# Patient Record
(demographics unavailable — no encounter records)

---

## 2024-11-05 NOTE — PHYSICAL EXAM
[de-identified] : [facility] 4 [de-identified] : 2+ [Normal] : no masses and lesions seen, face is symmetric [de-identified] : huge ITs

## 2024-11-05 NOTE — DATA REVIEWED
[de-identified] : 6/24: hgb 12.4, nl TSH 9/24 adenoid bx; b9 [de-identified] : 7/24 sinus: turb hypertrophy, DNS, asymmetric adenoid w/ fullness on the L [de-identified] : 8/24: AHI 87, LSat 63%

## 2024-11-05 NOTE — PHYSICAL EXAM
[de-identified] : [facility] 4 [de-identified] : 2+ [Normal] : no masses and lesions seen, face is symmetric [de-identified] : huge ITs

## 2024-11-05 NOTE — ASSESSMENT
[FreeTextEntry1] : We discussed options and elected to have him stay the night at [facility] given the severity of his RUBY.  Fully discussed perioperative care and the risks and benefits of septoplasty and turbinoplasty, including but not limited to cosmetic deformity, the need for more surgery, septal perforation, nosebleed, loss of sense of smell, poor scarring, infection, and a saddle nose deformity. The patient expressed understanding and desires to proceed. An educational perioperative care handout was given. The risks and benefits of adenoidectomy were fully discussed, including but not limited to postoperative hemorrhage, velopharyngeal insufficiency, swallowing or speaking problems. The patient and/or parent agreed and desired to proceed. An educational perioperative care handout was given.  Reference #: [id number]

## 2024-11-05 NOTE — PHYSICAL EXAM
[de-identified] : [facility] 4 [de-identified] : 2+ [Normal] : no masses and lesions seen, face is symmetric [de-identified] : huge ITs

## 2024-11-05 NOTE — DATA REVIEWED
[de-identified] : 6/24: hgb 12.4, nl TSH 9/24 adenoid bx; b9 [de-identified] : 7/24 sinus: turb hypertrophy, DNS, asymmetric adenoid w/ fullness on the L [de-identified] : 8/24: AHI 87, LSat 63%

## 2024-11-05 NOTE — HISTORY OF PRESENT ILLNESS
[de-identified] : His nose is permanently blocked- slight improvement after prednisone & fluticasone. He has had a "nasal" quality to his voice for as long as he can remember. This worsens with bad seasonal allergies; s/p skin testing several yrs ago. He's using OTC antihistamines. No hx frequent sinusitis but he has frequent bifrontal headaches. He now returns to preop for a septo/turbs/adenoidectomy Severe RUBY; he uses his CPAP nightly with a full face mask that someone gave him. He had a recent HSAT showing severe RUBY off CPAP. He hasn't seen pulm yet

## 2024-11-05 NOTE — DATA REVIEWED
[de-identified] : 6/24: hgb 12.4, nl TSH 9/24 adenoid bx; b9 [de-identified] : 7/24 sinus: turb hypertrophy, DNS, asymmetric adenoid w/ fullness on the L [de-identified] : 8/24: AHI 87, LSat 63%

## 2024-11-05 NOTE — ASSESSMENT
[FreeTextEntry1] : We discussed options and elected to have him stay the night at [facility] given the severity of his RUBY.  Fully discussed perioperative care and the risks and benefits of septoplasty and turbinoplasty, including but not limited to cosmetic deformity, the need for more surgery, septal perforation, nosebleed, loss of sense of smell, poor scarring, infection, and a saddle nose deformity. The patient expressed understanding and desires to proceed. An educational perioperative care handout was given. The risks and benefits of adenoidectomy were fully discussed, including but not limited to postoperative hemorrhage, velopharyngeal insufficiency, swallowing or speaking problems. The patient and/or parent agreed and desired to proceed. An educational perioperative care handout was given.  Reference #: [id number]

## 2024-11-05 NOTE — ASSESSMENT
[FreeTextEntry1] : We discussed options and elected to have him stay the night at [facility] given the severity of his RUBY.  Fully discussed perioperative care and the risks and benefits of septoplasty and turbinoplasty, including but not limited to cosmetic deformity, the need for more surgery, septal perforation, nosebleed, loss of sense of smell, poor scarring, infection, and a saddle nose deformity. The patient expressed understanding and desires to proceed. An educational perioperative care handout was given. The risks and benefits of adenoidectomy were fully discussed, including but not limited to postoperative hemorrhage, velopharyngeal insufficiency, swallowing or speaking problems. The patient and/or parent agreed and desired to proceed. An educational perioperative care handout was given.  Reference #: [id number]

## 2024-11-05 NOTE — HISTORY OF PRESENT ILLNESS
[de-identified] : His nose is permanently blocked- slight improvement after prednisone & fluticasone. He has had a "nasal" quality to his voice for as long as he can remember. This worsens with bad seasonal allergies; s/p skin testing several yrs ago. He's using OTC antihistamines. No hx frequent sinusitis but he has frequent bifrontal headaches. He now returns to preop for a septo/turbs/adenoidectomy Severe RUBY; he uses his CPAP nightly with a full face mask that someone gave him. He had a recent HSAT showing severe RUBY off CPAP. He hasn't seen pulm yet

## 2024-11-05 NOTE — HISTORY OF PRESENT ILLNESS
[de-identified] : His nose is permanently blocked- slight improvement after prednisone & fluticasone. He has had a "nasal" quality to his voice for as long as he can remember. This worsens with bad seasonal allergies; s/p skin testing several yrs ago. He's using OTC antihistamines. No hx frequent sinusitis but he has frequent bifrontal headaches. He now returns to preop for a septo/turbs/adenoidectomy Severe RUBY; he uses his CPAP nightly with a full face mask that someone gave him. He had a recent HSAT showing severe RUBY off CPAP. He hasn't seen pulm yet

## 2024-11-11 NOTE — DATA REVIEWED
[de-identified] : 6/24: hgb 12.4, nl TSH 9/24 adenoid bx: benign [de-identified] : 7/24 sinus: turb hypertrophy, DNS, asymmetric adenoid w/ fullness on the L [de-identified] : 8/24: AHI 87, LSat 63%

## 2024-11-11 NOTE — PHYSICAL EXAM
[General Appearance - Well Developed] : well developed [Normal Appearance] : normal appearance [Well Groomed] : well groomed [General Appearance - Well Nourished] : well nourished [No Deformities] : no deformities [General Appearance - In No Acute Distress] : no acute distress [Normal Conjunctiva] : the conjunctiva exhibited no abnormalities [Eyelids - No Xanthelasma] : the eyelids demonstrated no xanthelasmas [Normal Oral Mucosa] : normal oral mucosa [No Oral Pallor] : no oral pallor [No Oral Cyanosis] : no oral cyanosis [Normal Jugular Venous A Waves Present] : normal jugular venous A waves present [Normal Jugular Venous V Waves Present] : normal jugular venous V waves present [No Jugular Venous Leon A Waves] : no jugular venous leon A waves [Respiration, Rhythm And Depth] : normal respiratory rhythm and effort [Exaggerated Use Of Accessory Muscles For Inspiration] : no accessory muscle use [Auscultation Breath Sounds / Voice Sounds] : lungs were clear to auscultation bilaterally [Heart Rate And Rhythm] : heart rate and rhythm were normal [Heart Sounds] : normal S1 and S2 [Murmurs] : no murmurs present [Abdomen Soft] : soft [Abdomen Tenderness] : non-tender [Abdomen Mass (___ Cm)] : no abdominal mass palpated [Abnormal Walk] : normal gait [Gait - Sufficient For Exercise Testing] : the gait was sufficient for exercise testing [Nail Clubbing] : no clubbing of the fingernails [Cyanosis, Localized] : no localized cyanosis [Petechial Hemorrhages (___cm)] : no petechial hemorrhages [Skin Color & Pigmentation] : normal skin color and pigmentation [] : no rash [No Venous Stasis] : no venous stasis [Skin Lesions] : no skin lesions [No Skin Ulcers] : no skin ulcer [No Xanthoma] : no  xanthoma was observed [Oriented To Time, Place, And Person] : oriented to person, place, and time [Affect] : the affect was normal [No Anxiety] : not feeling anxious [Mood] : the mood was normal

## 2024-11-11 NOTE — HISTORY OF PRESENT ILLNESS
[de-identified] : His nose is permanently blocked- slight improvement after prednisone & fluticasone. He has had a "nasal" quality to his voice for as long as he can remember. This worsens with bad seasonal allergies; s/p skin testing several yrs ago. He's using OTC antihistamines. No hx frequent sinusitis but he has frequent bifrontal headaches. He now returns to further discuss a septo/turbs/adenoidectomy with an overnight stay for monitoring Severe RUBY; he uses his CPAP nightly with a full face mask that someone gave him. He had a recent HSAT showing severe RUBY off CPAP. He hasn't seen pulm yet

## 2024-11-11 NOTE — REASON FOR VISIT
[Other Location: e.g. School (Enter Location, City,State)___] : at [unfilled], at the time of the visit. [Other Location: e.g. Home (Enter Location, City,State)___] : at [unfilled] [Patient] : the patient [Subsequent Evaluation] : a subsequent evaluation for [FreeTextEntry2] : discuss surgery

## 2024-11-12 NOTE — HISTORY OF PRESENT ILLNESS
[Preoperative Visit] : for a medical evaluation prior to surgery [Good] : Good [Prior Anesthesia] : Prior anesthesia [Electrocardiogram] : ~T an ECG ~C was performed [Echocardiogram] : ~T an echocardiogram ~C was performed [Cardiovascular Stress Test] : a cardiac stress test ~T ~C was performed [Fair] : Fair [Scheduled Procedure ___] : a [unfilled] [Date of Surgery ___] : on [unfilled] [Surgeon Name ___] : surgeon: [unfilled] [Fever] : no fever [Chills] : no chills [Fatigue] : no fatigue [Chest Pain] : no chest pain [Cough] : no cough [Dyspnea] : no dyspnea [Dysuria] : no dysuria [Urinary Frequency] : no urinary frequency [Nausea] : no nausea [Vomiting] : no vomiting [Diarrhea] : no diarrhea [Abdominal Pain] : no abdominal pain [Easy Bruising] : no easy bruising [Lower Extremity Swelling] : no lower extremity swelling [Poor Exercise Tolerance] : no poor exercise tolerance [Diabetes] : no diabetes [Cardiovascular Disease] : no cardiovascular disease [Pulmonary Disease] : no pulmonary disease [Anti-Platelet Agents] : no anti-platelet agents [Nicotine Dependence] : no nicotine dependence [Renal Disease] : no renal disease [GI Disease] : no gastrointestinal disease [Sleep Apnea] : no sleep apnea [Thromboembolic Problems] : no thromboembolic problems [Frequent use of NSAIDs] : no use of NSAIDs [Transfusion Reaction] : no transfusion reaction [Impaired Immunity] : no impaired immunity [Steroid Use in Last 6 Months] : no steroid use in the last six months [Frequent Aspirin Use] : no frequent aspirin use [Prev Anesthesia Reaction] : no previous anesthesia reaction [Anesthesia Reaction] : no anesthesia reaction [Sudden Death] : no sudden death [Clotting Disorder] : no clotting disorder [Bleeding Disorder] : no bleeding disorder [de-identified] : Dr De Leon [FreeTextEntry1] : Mr. MASON presents for a follow up today. He denies chest pain, dyspnea or palpitations. No issues reported with his medications. Otherwise, he is feeling well. From CV standpoint, he completed an echocardiogram and nuclear stress test.

## 2024-11-12 NOTE — DISCUSSION/SUMMARY
[Procedure Intermediate Risk] : the procedure risk is intermediate [Patient Intermediate Risk] : the patient is an intermediate risk [Optimized for Surgery] : the patient is optimized for surgery [FreeTextEntry1] : Pre-op From cardiovascular standpoint, Mr. MASON is of acceptable risk for the planned procedure and may move forward without further cardiovascular testing. EKG section of the chart --- secondary to symptoms above an electrocardiogram also known as an EKG was performed.  Risks and benefits discussed with the patient. Patient was given time and privacy to changed into a gown. Shortly after, standard 10 leads were applied and a [facility] system was used to perform the study. The results were subsequently reviewed by attending physician and discussed with the patient. The study showed a normal sinus rhythm and no ST-T suggestive of ischemia. Order for the EKG was placed in the chart. The results were documented. Billing submitted. HTN - KAILEY  and I had an extensive discussion regarding his blood pressure management. Patient will continue taking current medications in addition to maintaining a low Na diet, with periodic b/p checks at home.

## 2024-11-12 NOTE — HISTORY OF PRESENT ILLNESS
[Preoperative Visit] : for a medical evaluation prior to surgery [Good] : Good [Prior Anesthesia] : Prior anesthesia [Electrocardiogram] : ~T an ECG ~C was performed [Echocardiogram] : ~T an echocardiogram ~C was performed [Cardiovascular Stress Test] : a cardiac stress test ~T ~C was performed [Fair] : Fair [Scheduled Procedure ___] : a [unfilled] [Date of Surgery ___] : on [unfilled] [Surgeon Name ___] : surgeon: [unfilled] [Fever] : no fever [Chills] : no chills [Fatigue] : no fatigue [Chest Pain] : no chest pain [Cough] : no cough [Dyspnea] : no dyspnea [Dysuria] : no dysuria [Urinary Frequency] : no urinary frequency [Nausea] : no nausea [Vomiting] : no vomiting [Diarrhea] : no diarrhea [Abdominal Pain] : no abdominal pain [Easy Bruising] : no easy bruising [Lower Extremity Swelling] : no lower extremity swelling [Poor Exercise Tolerance] : no poor exercise tolerance [Diabetes] : no diabetes [Cardiovascular Disease] : no cardiovascular disease [Pulmonary Disease] : no pulmonary disease [Anti-Platelet Agents] : no anti-platelet agents [Nicotine Dependence] : no nicotine dependence [Renal Disease] : no renal disease [GI Disease] : no gastrointestinal disease [Sleep Apnea] : no sleep apnea [Thromboembolic Problems] : no thromboembolic problems [Frequent use of NSAIDs] : no use of NSAIDs [Transfusion Reaction] : no transfusion reaction [Impaired Immunity] : no impaired immunity [Steroid Use in Last 6 Months] : no steroid use in the last six months [Frequent Aspirin Use] : no frequent aspirin use [Prev Anesthesia Reaction] : no previous anesthesia reaction [Anesthesia Reaction] : no anesthesia reaction [Sudden Death] : no sudden death [Clotting Disorder] : no clotting disorder [Bleeding Disorder] : no bleeding disorder [de-identified] : Dr De Leon [FreeTextEntry1] : Mr. MASON presents for a follow up today. He denies chest pain, dyspnea or palpitations. No issues reported with his medications. Otherwise, he is feeling well. From CV standpoint, he completed an echocardiogram and nuclear stress test.

## 2024-11-12 NOTE — DISCUSSION/SUMMARY
[Procedure Intermediate Risk] : the procedure risk is intermediate [Patient Intermediate Risk] : the patient is an intermediate risk [Optimized for Surgery] : the patient is optimized for surgery [FreeTextEntry1] : Pre-op From cardiovascular standpoint, Mr. MASON is of acceptable risk for the planned procedure and may move forward without further cardiovascular testing. EKG section of the chart --- secondary to symptoms above an electrocardiogram also known as an EKG was performed.  Risks and benefits discussed with the patient. Patient was given time and privacy to changed into a gown. Shortly after, standard 10 leads were applied and a [employer] system was used to perform the study. The results were subsequently reviewed by attending physician and discussed with the patient. The study showed a normal sinus rhythm and no ST-T suggestive of ischemia. Order for the EKG was placed in the chart. The results were documented. Billing submitted. HTN - KAILEY  and I had an extensive discussion regarding his blood pressure management. Patient will continue taking current medications in addition to maintaining a low Na diet, with periodic b/p checks at home.

## 2024-11-26 NOTE — HISTORY OF PRESENT ILLNESS
[Never] : never [TextBox_4] : 36 year old with recent sleep study in august showing severe RUBY presenting for evaluation. Patient states that he was previously on PAP therapy which had relived some of his symptoms but had recently returned. He notices that he is tired all the time, was falling asleep inappropriately, snoring, and constant fatigue. He has no family hx of RUBY.  Now over the last few months he has noticed that he is starting to feel tired again. Given all these symptoms he presents for RUBY evaluation. [ESS] : 11

## 2024-11-26 NOTE — PROCEDURE
[FreeTextEntry6] : Indication: a large amount of turbinate inflammation; requirement for postoperative debridement After verbal consent and the administration of an aerosolized oxymetazoline/lidocaine mix, examination and debridement was performed with a rigid 0 deg endoscope septum intact & midline, healing well turbs very inflamed adenoid reduced & healing well debridement performed bilaterally using suction and forceps as required

## 2024-11-26 NOTE — ASSESSMENT
[FreeTextEntry1] : I encouraged him to follow up with pulm and get back on CPAP.   Doing well; further care discussed. RTC 3 wks sooner prn. Continue frequent saline rinses.

## 2024-11-26 NOTE — ASSESSMENT
[FreeTextEntry1] : 36 year old with severe RUBY presenting for evaluation with sleep study in august.   Data reviewed: HST 08/2024- 4% 87; 3% 87.2- Severe RUBY with significant nighttime desaturation   Severe RUBY Daytime hypersomnolence Snoring  Patient with severe RUBY and seems to have improved on pap therapy in the past. Will plan to start him on PAP therapy and return to clinic in about 4 months to see how he is feeling. Based on PAP report and symptoms will determine if in lab is needed as he did have a lot of desaturations that would possibly be hypoventilation.  - PAP therapy for severe RUBY  RTC in 4 months

## 2024-11-26 NOTE — HISTORY OF PRESENT ILLNESS
[de-identified] : Now s/p septo/turbs/adenoidectomy 11/15/24- doing well so far.  Bad seasonal allergies; s/p skin testing several yrs ago. He's using OTC antihistamines. No hx frequent sinusitis but he has frequent bifrontal headaches.  Severe RUBY; he uses his CPAP nightly with a full face mask that someone gave him. He saw Dr. Woods today with plans to restart PAP

## 2024-11-26 NOTE — PHYSICAL EXAM
[Nasal Endoscopy Performed] : nasal endoscopy was performed, see procedure section for findings [de-identified] : significantly inflamed; midline, intact septum [Normal] : no masses and lesions seen, face is symmetric

## 2024-11-26 NOTE — DATA REVIEWED
[de-identified] : 6/24: hgb 12.4, nl TSH 9/24 adenoid bx: benign [de-identified] : 7/24 sinus: turb hypertrophy, DNS, asymmetric adenoid w/ fullness on the L [de-identified] : 8/24: AHI 87, LSat 63% DC instructions

## 2024-12-16 NOTE — PHYSICAL EXAM
[Nasal Endoscopy Performed] : nasal endoscopy was performed, see procedure section for findings [Normal] : no abnormal secretions [de-identified] : well reduced; debrided

## 2024-12-16 NOTE — PROCEDURE
[FreeTextEntry6] : Indication: requirement for exam not possible via anterior rhinoscopy; chronic nasal obstruction After verbal consent and the administration of an aerosolized oxymetazoline/lidocaine mix, examination was performed with a flexible endoscope attached to a video monitoring system. Findings: Septum: midline, intact Mucosa: normal Polyposis: not present Inferior turbinates: well reduced Middle and superior turbinates: normal Inferior meatus: unremarkable Middle meatus: unremarkable Superior meatus: unremarkable Speno-ethmoidal recess: unremarkable Nasopharynx: some residual adenoid on the L Secretions: unremarkable Other findings: none

## 2024-12-16 NOTE — ASSESSMENT
[FreeTextEntry1] : We discussed the fact that his RUBY is probably not cured by the surgery though his sleep is improved; he will discuss possible repeat testing w/ Dr. Woods.   RTC 3 months sooner prn

## 2024-12-16 NOTE — DATA REVIEWED
[de-identified] : 6/24: hgb 12.4, nl TSH 9/24 adenoid bx: benign [de-identified] : 7/24 sinus: turb hypertrophy, DNS, asymmetric adenoid w/ fullness on the L [de-identified] : 8/24: AHI 87, LSat 63%

## 2025-01-31 NOTE — DISCUSSION/SUMMARY
[FreeTextEntry1] : CP check CCTA if able to approve and schedule. echo reviewed. Palpitations check a 7 day holter if able to approve and schedule. Dizziness check carotid us if able to approve and schedule. WILBERT BONNER and I discussed his lipid panel and individualized target LDL goal. At this point, will do diet and exercise with anticipation of re-evaluating labs in 3-6 months

## 2025-01-31 NOTE — REASON FOR VISIT
[Symptom and Test Evaluation] : symptom and test evaluation [Family Member] : family member [FreeTextEntry1] : 36M comes in for a visit. He is having new chest pain episodes. The discomfort is midsternal. He gets it more with rest than activity. Dizziness is noted. No syncope. Palpitations noted at rest. Notes leg fatigue as well. Immediate diagnosis, testing and resolution requested.

## 2025-03-20 NOTE — REASON FOR VISIT
[Symptom and Test Evaluation] : symptom and test evaluation [FreeTextEntry1] : 36M comes in for a follow up of palpitations and chest pain. He completed a carotid us. He saw another physician since last we met. He is currently wearing another holter monitor. In addition, as per patient, he had several sonograms performed with another doctor he is seeing.  Cardiac cta was denied by the insurance.

## 2025-03-20 NOTE — ASSESSMENT
[FreeTextEntry1] : 36 year old with severe RUBY presenting for evaluation with sleep study in august.   Data reviewed: HST 08/2024- 4% 87; 3% 87.2- Severe RUBY with significant nighttime desaturation Compliance report- 01/31/2025- 03/01/2025- 29/30 days; average hours 7 and 13 minutes; AHI 9.2; median pressure 14  Severe RUBY Daytime hypersomnolence Snoring  Patient is improved with AHI just under 10. Does have some residual symptoms to will continue therapy for the next 3 months and then see back to ensure that AHI is improved and clinically he is improved. Pressure on compliance report are high but adequate. At goal compliance  - PAP therapy for severe RUBY - Compliance report at goal  RTC in 4 months

## 2025-03-20 NOTE — DISCUSSION/SUMMARY
[FreeTextEntry1] : Palpitations check echo if able to approve and schedule. CP testing reviewed. Since CCTA is not deemed warranted by the insurance company, will hold off at this time Carotid imaging reviewed no additional intervention Patient advised to share results of outside imaging if he wants me to review them Emotional support provided.

## 2025-03-20 NOTE — HISTORY OF PRESENT ILLNESS
[Never] : never [TextBox_4] : 36 year old with recent sleep study in august showing severe RUBY presenting for evaluation. Patient states that he was previously on PAP therapy which had relived some of his symptoms but had recently returned. He notices that he is tired all the time, was falling asleep inappropriately, snoring, and constant fatigue. He has no family hx of RUBY.  Now over the last few months he has noticed that he is starting to feel tired again. Given all these symptoms he presents for RUBY evaluation.  3/19/25 Patient presenting for follow up stating that he has been using machine and does feel improvement. Does still feel tired during the daytime but still improvement from previous. States that he has a Large mask but is still small for his face and was told that he needs order for XL and that company can order. [ESS] : 11

## 2025-05-13 NOTE — PHYSICAL EXAM
[Binocular Microscopic Exam] : Binocular microscopic exam was performed [de-identified] : EOMI/PERRL w/ no resting nystagmus; CN 2-12 intact; good smooth pursuit; negative fistula test AU; negative [place] Hallpike & supine roll test bilaterally using Frenzel goggles; normal Hamalgyi head thrust; negative enhanced Rhomberg; unremarkable gait; no dysdiadochokinesia [Nasal Endoscopy Performed] : nasal endoscopy was performed, see procedure section for findings [de-identified] : well reduced; debrided [Normal] : no masses and lesions seen, face is symmetric

## 2025-05-13 NOTE — ASSESSMENT
[FreeTextEntry1] : Unclear source of his dizziness; we discussed reasons to RTC or go to the ED  I encouraged him to use his CPAP

## 2025-05-13 NOTE — HISTORY OF PRESENT ILLNESS
[de-identified] : Has been having some unstable/dizzy periods without spinning for ~last six months; this lasts seconds to minutes and is inconsistent. No hearing loss or tinnitus.  s/p septo/turbs/[id number]/15/24- his breathing and sleep are improved.  He has a new CPAP which is working well for him.

## 2025-05-13 NOTE — DATA REVIEWED
[de-identified] : 6/24: hgb 12.4, nl TSH  [de-identified] : 3/25 CT head noncon: neg [de-identified] : 8/24: AHI 87, LSat 63%